# Patient Record
Sex: MALE | Race: ASIAN | NOT HISPANIC OR LATINO
[De-identification: names, ages, dates, MRNs, and addresses within clinical notes are randomized per-mention and may not be internally consistent; named-entity substitution may affect disease eponyms.]

---

## 2018-11-18 ENCOUNTER — APPOINTMENT (OUTPATIENT)
Dept: HUMAN REPRODUCTION | Facility: CLINIC | Age: 43
End: 2018-11-18

## 2019-01-06 ENCOUNTER — APPOINTMENT (OUTPATIENT)
Dept: HUMAN REPRODUCTION | Facility: CLINIC | Age: 44
End: 2019-01-06
Payer: COMMERCIAL

## 2019-01-06 PROCEDURE — 89322 SEMEN ANAL STRICT CRITERIA: CPT

## 2019-01-20 ENCOUNTER — TRANSCRIPTION ENCOUNTER (OUTPATIENT)
Age: 44
End: 2019-01-20

## 2019-01-20 ENCOUNTER — APPOINTMENT (OUTPATIENT)
Dept: HUMAN REPRODUCTION | Facility: CLINIC | Age: 44
End: 2019-01-20

## 2020-06-02 ENCOUNTER — TRANSCRIPTION ENCOUNTER (OUTPATIENT)
Age: 45
End: 2020-06-02

## 2020-08-13 ENCOUNTER — APPOINTMENT (OUTPATIENT)
Dept: INTERNAL MEDICINE | Facility: CLINIC | Age: 45
End: 2020-08-13
Payer: COMMERCIAL

## 2020-08-13 VITALS
WEIGHT: 132 LBS | HEART RATE: 77 BPM | BODY MASS INDEX: 22.26 KG/M2 | SYSTOLIC BLOOD PRESSURE: 121 MMHG | OXYGEN SATURATION: 99 % | HEIGHT: 64.5 IN | RESPIRATION RATE: 16 BRPM | TEMPERATURE: 97.9 F | DIASTOLIC BLOOD PRESSURE: 78 MMHG

## 2020-08-13 DIAGNOSIS — R56.9 UNSPECIFIED CONVULSIONS: ICD-10-CM

## 2020-08-13 DIAGNOSIS — Z82.49 FAMILY HISTORY OF ISCHEMIC HEART DISEASE AND OTHER DISEASES OF THE CIRCULATORY SYSTEM: ICD-10-CM

## 2020-08-13 DIAGNOSIS — E78.9 DISORDER OF LIPOPROTEIN METABOLISM, UNSPECIFIED: ICD-10-CM

## 2020-08-13 DIAGNOSIS — E78.00 PURE HYPERCHOLESTEROLEMIA, UNSPECIFIED: ICD-10-CM

## 2020-08-13 PROCEDURE — 99386 PREV VISIT NEW AGE 40-64: CPT

## 2020-08-13 NOTE — HEALTH RISK ASSESSMENT
[Good] : ~his/her~  mood as  good [No] : In the past 12 months have you used drugs other than those required for medical reasons? No [No falls in past year] : Patient reported no falls in the past year [0] : 2) Feeling down, depressed, or hopeless: Not at all (0) [None] : None [HIV test declined] : HIV test declined [Fully functional (using the telephone, shopping, preparing meals, housekeeping, doing laundry, using] : Fully functional and needs no help or supervision to perform IADLs (using the telephone, shopping, preparing meals, housekeeping, doing laundry, using transportation, managing medications and managing finances) [Feels Safe at Home] : Feels safe at home [Fully functional (bathing, dressing, toileting, transferring, walking, feeding)] : Fully functional (bathing, dressing, toileting, transferring, walking, feeding) [Carbon Monoxide Detector] : carbon monoxide detector [Seat Belt] :  uses seat belt [Smoke Detector] : smoke detector [Sunscreen] : uses sunscreen [With Patient/Caregiver] : With Patient/Caregiver [] : No [FreeTextEntry1] : Check up\par  [[id number]] : 0 [de-identified] : None [Change in mental status noted] : No change in mental status noted [Reports changes in hearing] : Reports no changes in hearing [Reports changes in vision] : Reports no changes in vision [Reports changes in dental health] : Reports no changes in dental health [AdvancecareDate] : 08/20

## 2020-08-13 NOTE — HISTORY OF PRESENT ILLNESS
[de-identified] : 44 year old male patient with history of stable , history as stated, presented for an annual preventative examination.\par Patient denies any associated symptoms of shortness of breath, chest pain, abdominal pain at this time.\par No reported break through seizures since more than 15 years.

## 2020-08-13 NOTE — ASSESSMENT
[FreeTextEntry1] : 44 year old male found to have stable Seizures,with the current regimen, diet and life style modifications, as counseled. Prior results reviewed and discussed with the patient during today's examination. Plan as ordered.\par Flu/TDAp vaccinations as feasible in the future, as per prior history of immunization.\par

## 2020-08-16 ENCOUNTER — TRANSCRIPTION ENCOUNTER (OUTPATIENT)
Age: 45
End: 2020-08-16

## 2020-08-16 DIAGNOSIS — U07.1 COVID-19: ICD-10-CM

## 2020-08-16 LAB
25(OH)D3 SERPL-MCNC: 52.6 NG/ML
ALBUMIN SERPL ELPH-MCNC: 4.8 G/DL
ALP BLD-CCNC: 69 U/L
ALT SERPL-CCNC: 18 U/L
ANION GAP SERPL CALC-SCNC: 12 MMOL/L
APPEARANCE: CLEAR
AST SERPL-CCNC: 19 U/L
BASOPHILS # BLD AUTO: 0.11 K/UL
BASOPHILS NFR BLD AUTO: 1.7 %
BILIRUB SERPL-MCNC: 0.4 MG/DL
BILIRUBIN URINE: NEGATIVE
BLOOD URINE: NEGATIVE
BUN SERPL-MCNC: 9 MG/DL
CALCIUM SERPL-MCNC: 9.5 MG/DL
CARBAMAZEPINE SERPL-MCNC: 5.7 UG/ML
CHLORIDE SERPL-SCNC: 97 MMOL/L
CHOLEST SERPL-MCNC: 184 MG/DL
CHOLEST/HDLC SERPL: 4.4 RATIO
CO2 SERPL-SCNC: 26 MMOL/L
COLOR: NORMAL
CREAT SERPL-MCNC: 1.03 MG/DL
CREAT SPEC-SCNC: 130 MG/DL
EOSINOPHIL # BLD AUTO: 0.54 K/UL
EOSINOPHIL NFR BLD AUTO: 8.4 %
ERYTHROCYTE [SEDIMENTATION RATE] IN BLOOD BY WESTERGREN METHOD: 2 MM/HR
ESTIMATED AVERAGE GLUCOSE: 103 MG/DL
FOLATE SERPL-MCNC: 18 NG/ML
GGT SERPL-CCNC: 36 U/L
GLUCOSE QUALITATIVE U: NEGATIVE
GLUCOSE SERPL-MCNC: 89 MG/DL
HBA1C MFR BLD HPLC: 5.2 %
HCT VFR BLD CALC: 46.9 %
HDLC SERPL-MCNC: 42 MG/DL
HGB BLD-MCNC: 15.1 G/DL
IMM GRANULOCYTES NFR BLD AUTO: 0.2 %
IRON SATN MFR SERPL: 50 %
IRON SERPL-MCNC: 154 UG/DL
KETONES URINE: NEGATIVE
LDLC SERPL CALC-MCNC: 123 MG/DL
LEUKOCYTE ESTERASE URINE: NEGATIVE
LYMPHOCYTES # BLD AUTO: 2.58 K/UL
LYMPHOCYTES NFR BLD AUTO: 40.2 %
MAN DIFF?: NORMAL
MCHC RBC-ENTMCNC: 27.4 PG
MCHC RBC-ENTMCNC: 32.2 GM/DL
MCV RBC AUTO: 85 FL
MICROALBUMIN 24H UR DL<=1MG/L-MCNC: <1.2 MG/DL
MICROALBUMIN/CREAT 24H UR-RTO: NORMAL MG/G
MONOCYTES # BLD AUTO: 0.51 K/UL
MONOCYTES NFR BLD AUTO: 8 %
NEUTROPHILS # BLD AUTO: 2.66 K/UL
NEUTROPHILS NFR BLD AUTO: 41.5 %
NITRITE URINE: NEGATIVE
PH URINE: 7.5
PLATELET # BLD AUTO: 212 K/UL
POTASSIUM SERPL-SCNC: 4.7 MMOL/L
PROT SERPL-MCNC: 7.2 G/DL
PROTEIN URINE: NEGATIVE
RBC # BLD: 5.52 M/UL
RBC # FLD: 12.4 %
SARS-COV-2 IGG SERPL IA-ACNC: 126 INDEX
SARS-COV-2 IGG SERPL QL IA: POSITIVE
SODIUM SERPL-SCNC: 135 MMOL/L
SPECIFIC GRAVITY URINE: 1.01
T3 SERPL-MCNC: 113 NG/DL
T4 FREE SERPL-MCNC: 1.1 NG/DL
TIBC SERPL-MCNC: 307 UG/DL
TRIGL SERPL-MCNC: 95 MG/DL
TSH SERPL-ACNC: 1.82 UIU/ML
UIBC SERPL-MCNC: 153 UG/DL
UROBILINOGEN URINE: NORMAL
VIT B12 SERPL-MCNC: 736 PG/ML
WBC # FLD AUTO: 6.41 K/UL

## 2021-06-23 ENCOUNTER — RX RENEWAL (OUTPATIENT)
Age: 46
End: 2021-06-23

## 2021-09-13 LAB
25(OH)D3 SERPL-MCNC: 52.5 NG/ML
ALBUMIN SERPL ELPH-MCNC: 4.7 G/DL
ALP BLD-CCNC: 62 U/L
ALT SERPL-CCNC: 31 U/L
ANION GAP SERPL CALC-SCNC: 10 MMOL/L
APPEARANCE: CLEAR
AST SERPL-CCNC: 24 U/L
BASOPHILS # BLD AUTO: 0.08 K/UL
BASOPHILS NFR BLD AUTO: 1.3 %
BILIRUB SERPL-MCNC: 0.5 MG/DL
BILIRUBIN URINE: NEGATIVE
BLOOD URINE: NEGATIVE
BUN SERPL-MCNC: 10 MG/DL
CALCIUM SERPL-MCNC: 9.5 MG/DL
CARBAMAZEPINE SERPL-MCNC: 6.8 UG/ML
CHLORIDE SERPL-SCNC: 97 MMOL/L
CHOLEST SERPL-MCNC: 210 MG/DL
CO2 SERPL-SCNC: 26 MMOL/L
COLOR: NORMAL
CREAT SERPL-MCNC: 1.06 MG/DL
CREAT SPEC-SCNC: 88 MG/DL
EOSINOPHIL # BLD AUTO: 0.61 K/UL
EOSINOPHIL NFR BLD AUTO: 9.6 %
ERYTHROCYTE [SEDIMENTATION RATE] IN BLOOD BY WESTERGREN METHOD: < 2 MM/HR
ESTIMATED AVERAGE GLUCOSE: 108 MG/DL
FOLATE SERPL-MCNC: >20 NG/ML
GGT SERPL-CCNC: 40 U/L
GLUCOSE QUALITATIVE U: NEGATIVE
GLUCOSE SERPL-MCNC: 98 MG/DL
HBA1C MFR BLD HPLC: 5.4 %
HCT VFR BLD CALC: 46 %
HDLC SERPL-MCNC: 41 MG/DL
HGB BLD-MCNC: 14.8 G/DL
IMM GRANULOCYTES NFR BLD AUTO: 0.2 %
IRON SATN MFR SERPL: 57 %
IRON SERPL-MCNC: 187 UG/DL
KETONES URINE: NEGATIVE
LDLC SERPL CALC-MCNC: 145 MG/DL
LEUKOCYTE ESTERASE URINE: NEGATIVE
LYMPHOCYTES # BLD AUTO: 2.74 K/UL
LYMPHOCYTES NFR BLD AUTO: 43 %
MAN DIFF?: NORMAL
MCHC RBC-ENTMCNC: 27.6 PG
MCHC RBC-ENTMCNC: 32.2 GM/DL
MCV RBC AUTO: 85.7 FL
MICROALBUMIN 24H UR DL<=1MG/L-MCNC: <1.2 MG/DL
MICROALBUMIN/CREAT 24H UR-RTO: NORMAL MG/G
MONOCYTES # BLD AUTO: 0.51 K/UL
MONOCYTES NFR BLD AUTO: 8 %
NEUTROPHILS # BLD AUTO: 2.42 K/UL
NEUTROPHILS NFR BLD AUTO: 37.9 %
NITRITE URINE: NEGATIVE
NONHDLC SERPL-MCNC: 169 MG/DL
PH URINE: 7.5
PLATELET # BLD AUTO: 196 K/UL
POTASSIUM SERPL-SCNC: 4.6 MMOL/L
PROT SERPL-MCNC: 7.2 G/DL
PROTEIN URINE: NEGATIVE
RBC # BLD: 5.37 M/UL
RBC # FLD: 13 %
SODIUM SERPL-SCNC: 132 MMOL/L
SPECIFIC GRAVITY URINE: 1.01
T3 SERPL-MCNC: 99 NG/DL
T4 FREE SERPL-MCNC: 1.1 NG/DL
TIBC SERPL-MCNC: 325 UG/DL
TRIGL SERPL-MCNC: 118 MG/DL
TSH SERPL-ACNC: 1.89 UIU/ML
UIBC SERPL-MCNC: 138 UG/DL
UROBILINOGEN URINE: NORMAL
VIT B12 SERPL-MCNC: 869 PG/ML
WBC # FLD AUTO: 6.37 K/UL

## 2021-09-18 ENCOUNTER — APPOINTMENT (OUTPATIENT)
Dept: INTERNAL MEDICINE | Facility: CLINIC | Age: 46
End: 2021-09-18
Payer: COMMERCIAL

## 2021-09-18 VITALS
BODY MASS INDEX: 22.77 KG/M2 | DIASTOLIC BLOOD PRESSURE: 72 MMHG | OXYGEN SATURATION: 98 % | RESPIRATION RATE: 16 BRPM | WEIGHT: 135 LBS | TEMPERATURE: 97.7 F | HEART RATE: 76 BPM | SYSTOLIC BLOOD PRESSURE: 107 MMHG | HEIGHT: 64.5 IN

## 2021-09-18 PROCEDURE — 99396 PREV VISIT EST AGE 40-64: CPT

## 2021-09-18 NOTE — HEALTH RISK ASSESSMENT
[Good] : ~his/her~  mood as  good [No] : In the past 12 months have you used drugs other than those required for medical reasons? No [No falls in past year] : Patient reported no falls in the past year [0] : 2) Feeling down, depressed, or hopeless: Not at all (0) [HIV test declined] : HIV test declined [None] : None [Feels Safe at Home] : Feels safe at home [Fully functional (bathing, dressing, toileting, transferring, walking, feeding)] : Fully functional (bathing, dressing, toileting, transferring, walking, feeding) [Fully functional (using the telephone, shopping, preparing meals, housekeeping, doing laundry, using] : Fully functional and needs no help or supervision to perform IADLs (using the telephone, shopping, preparing meals, housekeeping, doing laundry, using transportation, managing medications and managing finances) [Smoke Detector] : smoke detector [Carbon Monoxide Detector] : carbon monoxide detector [Seat Belt] :  uses seat belt [Sunscreen] : uses sunscreen [With Patient/Caregiver] : , with patient/caregiver [FreeTextEntry1] : Check up\par  [] : No [de-identified] : None [[id number]] : 0 [Change in mental status noted] : No change in mental status noted [Reports changes in hearing] : Reports no changes in hearing [Reports changes in vision] : Reports no changes in vision [Reports changes in dental health] : Reports no changes in dental health [AdvancecareDate] : 09/21

## 2021-09-18 NOTE — ASSESSMENT
[FreeTextEntry1] : 45 year old male found to have stable Hypercholesterolemia, Seizures,with the current prescription regimen as recommended, diet and life style modifications, as counseled. Prior results reviewed, interpreted and discussed with the patient during today's examination, as appropriate. Follow up, treatment plan and tests, as ordered.

## 2021-09-18 NOTE — HISTORY OF PRESENT ILLNESS
[de-identified] : 45 year old male patient with history of stable Hypercholesterolemia, Seizures, history as stated, presented for an annual preventative examination.\par Patient denies any associated symptoms of shortness of breath, chest pain, abdominal pain at this time.\par No reported break through seizures.\par

## 2022-04-22 ENCOUNTER — RX RENEWAL (OUTPATIENT)
Age: 47
End: 2022-04-22

## 2022-12-03 ENCOUNTER — APPOINTMENT (OUTPATIENT)
Dept: INTERNAL MEDICINE | Facility: CLINIC | Age: 47
End: 2022-12-03

## 2022-12-18 LAB
ALBUMIN SERPL ELPH-MCNC: 4.7 G/DL
ALP BLD-CCNC: 77 U/L
ALT SERPL-CCNC: 18 U/L
ANION GAP SERPL CALC-SCNC: 17 MMOL/L
APPEARANCE: CLEAR
AST SERPL-CCNC: 19 U/L
BACTERIA: NEGATIVE
BASOPHILS # BLD AUTO: 0.08 K/UL
BASOPHILS NFR BLD AUTO: 1.4 %
BILIRUB SERPL-MCNC: 0.4 MG/DL
BILIRUBIN URINE: NEGATIVE
BLOOD URINE: NEGATIVE
BUN SERPL-MCNC: 10 MG/DL
CALCIUM SERPL-MCNC: 9.5 MG/DL
CARBAMAZEPINE SERPL-MCNC: 7.3 UG/ML
CHLORIDE SERPL-SCNC: 97 MMOL/L
CHOLEST SERPL-MCNC: 209 MG/DL
CO2 SERPL-SCNC: 20 MMOL/L
COLOR: NORMAL
CREAT SERPL-MCNC: 1.08 MG/DL
CREAT SPEC-SCNC: 110 MG/DL
EGFR: 86 ML/MIN/1.73M2
EOSINOPHIL # BLD AUTO: 0.44 K/UL
EOSINOPHIL NFR BLD AUTO: 7.6 %
ESTIMATED AVERAGE GLUCOSE: 105 MG/DL
GGT SERPL-CCNC: 34 U/L
GLUCOSE QUALITATIVE U: NEGATIVE
GLUCOSE SERPL-MCNC: 93 MG/DL
HBA1C MFR BLD HPLC: 5.3 %
HCT VFR BLD CALC: 47 %
HDLC SERPL-MCNC: 45 MG/DL
HGB BLD-MCNC: 15.3 G/DL
HYALINE CASTS: 0 /LPF
IMM GRANULOCYTES NFR BLD AUTO: 0.2 %
KETONES URINE: NEGATIVE
LDLC SERPL CALC-MCNC: 142 MG/DL
LEUKOCYTE ESTERASE URINE: NEGATIVE
LYMPHOCYTES # BLD AUTO: 2.36 K/UL
LYMPHOCYTES NFR BLD AUTO: 41 %
MAN DIFF?: NORMAL
MCHC RBC-ENTMCNC: 27.4 PG
MCHC RBC-ENTMCNC: 32.6 GM/DL
MCV RBC AUTO: 84.2 FL
MICROALBUMIN 24H UR DL<=1MG/L-MCNC: <1.2 MG/DL
MICROALBUMIN/CREAT 24H UR-RTO: NORMAL MG/G
MICROSCOPIC-UA: NORMAL
MONOCYTES # BLD AUTO: 0.53 K/UL
MONOCYTES NFR BLD AUTO: 9.2 %
NEUTROPHILS # BLD AUTO: 2.34 K/UL
NEUTROPHILS NFR BLD AUTO: 40.6 %
NITRITE URINE: NEGATIVE
NONHDLC SERPL-MCNC: 164 MG/DL
PH URINE: 7
PLATELET # BLD AUTO: 236 K/UL
POTASSIUM SERPL-SCNC: 4.9 MMOL/L
PROT SERPL-MCNC: 7.2 G/DL
PROTEIN URINE: NEGATIVE
RBC # BLD: 5.58 M/UL
RBC # FLD: 13 %
RED BLOOD CELLS URINE: 3 /HPF
SODIUM SERPL-SCNC: 135 MMOL/L
SPECIFIC GRAVITY URINE: 1.01
SQUAMOUS EPITHELIAL CELLS: 0 /HPF
TRIGL SERPL-MCNC: 106 MG/DL
TSH SERPL-ACNC: 2.08 UIU/ML
UROBILINOGEN URINE: NORMAL
WBC # FLD AUTO: 5.76 K/UL
WHITE BLOOD CELLS URINE: 0 /HPF

## 2022-12-22 ENCOUNTER — APPOINTMENT (OUTPATIENT)
Dept: INTERNAL MEDICINE | Facility: CLINIC | Age: 47
End: 2022-12-22

## 2022-12-22 VITALS
WEIGHT: 139 LBS | DIASTOLIC BLOOD PRESSURE: 79 MMHG | RESPIRATION RATE: 16 BRPM | SYSTOLIC BLOOD PRESSURE: 116 MMHG | BODY MASS INDEX: 23.73 KG/M2 | TEMPERATURE: 98 F | HEART RATE: 64 BPM | OXYGEN SATURATION: 99 % | HEIGHT: 64 IN

## 2022-12-22 PROCEDURE — 99396 PREV VISIT EST AGE 40-64: CPT

## 2022-12-22 NOTE — HEALTH RISK ASSESSMENT
[Good] : ~his/her~  mood as  good [Never] : Never [No] : In the past 12 months have you used drugs other than those required for medical reasons? No [No falls in past year] : Patient reported no falls in the past year [0] : 2) Feeling down, depressed, or hopeless: Not at all (0) [HIV test declined] : HIV test declined [None] : None [Feels Safe at Home] : Feels safe at home [Fully functional (bathing, dressing, toileting, transferring, walking, feeding)] : Fully functional (bathing, dressing, toileting, transferring, walking, feeding) [Fully functional (using the telephone, shopping, preparing meals, housekeeping, doing laundry, using] : Fully functional and needs no help or supervision to perform IADLs (using the telephone, shopping, preparing meals, housekeeping, doing laundry, using transportation, managing medications and managing finances) [Smoke Detector] : smoke detector [Carbon Monoxide Detector] : carbon monoxide detector [Seat Belt] :  uses seat belt [Sunscreen] : uses sunscreen [With Patient/Caregiver] : , with patient/caregiver [FreeTextEntry1] : Check up\par  [de-identified] : None [[id number]] : 0 [Change in mental status noted] : No change in mental status noted [Reports changes in hearing] : Reports no changes in hearing [Reports changes in vision] : Reports no changes in vision [Reports changes in dental health] : Reports no changes in dental health [AdvancecareDate] : 12/22

## 2022-12-22 NOTE — ASSESSMENT
[FreeTextEntry1] : 47 year old male found to have stable Hypercholesterolemia, Seizures,with the current prescription regimen as recommended, diet and life style modifications, as counseled. Prior results reviewed, interpreted and discussed with the patient during today's examination, as appropriate. Follow up, treatment plan and tests, as ordered.\par \par

## 2022-12-22 NOTE — HISTORY OF PRESENT ILLNESS
[de-identified] : 47 year old male patient with history of stable Hypercholesterolemia, Seizures, history as stated, presented for an annual preventative examination.\par Patient denies any associated symptoms of shortness of breath, chest pain, abdominal pain at this time.\par No reported break through seizures.\par

## 2022-12-22 NOTE — REVIEW OF SYSTEMS
[Negative] : Heme/Lymph [FreeTextEntry9] : Left knee resolving painful swelling, if symptoms persist, consider orthopedic follow-up, as counseled.

## 2023-12-18 ENCOUNTER — LABORATORY RESULT (OUTPATIENT)
Age: 48
End: 2023-12-18

## 2023-12-21 ENCOUNTER — APPOINTMENT (OUTPATIENT)
Dept: INTERNAL MEDICINE | Facility: CLINIC | Age: 48
End: 2023-12-21
Payer: COMMERCIAL

## 2023-12-21 VITALS
TEMPERATURE: 98.6 F | OXYGEN SATURATION: 100 % | HEIGHT: 64 IN | DIASTOLIC BLOOD PRESSURE: 83 MMHG | HEART RATE: 64 BPM | RESPIRATION RATE: 16 BRPM | WEIGHT: 140 LBS | SYSTOLIC BLOOD PRESSURE: 132 MMHG | BODY MASS INDEX: 23.9 KG/M2

## 2023-12-21 DIAGNOSIS — Z00.00 ENCOUNTER FOR GENERAL ADULT MEDICAL EXAMINATION W/OUT ABNORMAL FINDINGS: ICD-10-CM

## 2023-12-21 PROCEDURE — 99396 PREV VISIT EST AGE 40-64: CPT

## 2023-12-21 RX ORDER — CARBAMAZEPINE 200 MG/1
200 TABLET ORAL TWICE DAILY
Qty: 360 | Refills: 3 | Status: ACTIVE | COMMUNITY
Start: 2021-06-23 | End: 1900-01-01

## 2023-12-21 NOTE — HEALTH RISK ASSESSMENT
[Good] : ~his/her~  mood as  good [No] : In the past 12 months have you used drugs other than those required for medical reasons? No [No falls in past year] : Patient reported no falls in the past year [0] : 2) Feeling down, depressed, or hopeless: Not at all (0) [HIV test declined] : HIV test declined [None] : None [Feels Safe at Home] : Feels safe at home [Fully functional (bathing, dressing, toileting, transferring, walking, feeding)] : Fully functional (bathing, dressing, toileting, transferring, walking, feeding) [Fully functional (using the telephone, shopping, preparing meals, housekeeping, doing laundry, using] : Fully functional and needs no help or supervision to perform IADLs (using the telephone, shopping, preparing meals, housekeeping, doing laundry, using transportation, managing medications and managing finances) [Smoke Detector] : smoke detector [Carbon Monoxide Detector] : carbon monoxide detector [Seat Belt] :  uses seat belt [Sunscreen] : uses sunscreen [With Patient/Caregiver] : , with patient/caregiver [Never] : Never [FreeTextEntry1] : Check up\par   [de-identified] : None [[id number]] : 0 [Change in mental status noted] : No change in mental status noted [Reports changes in hearing] : Reports no changes in hearing [Reports changes in vision] : Reports no changes in vision [Reports changes in dental health] : Reports no changes in dental health [AdvancecareDate] : 12/23

## 2023-12-21 NOTE — ASSESSMENT
[FreeTextEntry1] : 48 year old male found to have stable Hypercholesterolemia, Seizures, with the current prescription regimen as recommended, diet and lifestyle modifications, as counseled. Prior results reviewed, interpreted and discussed with the patient during today's examination, as appropriate. Follow up, treatment plan and tests, as ordered.

## 2023-12-21 NOTE — PHYSICAL EXAM
[TextEntry] : CONSTITUTIONAL:  No acute distress, well developed and well appearing. EYES:  Normal sclera/conjunctiva, PERRLA, EOMI. ENT:  Normal outer ears/nose, normal oropharynx. NECK:  No JVD, supple, thyroid normal/no nodules, no lymphadenopathy. PULMONARY:  No respiratory distress, clear to auscultation, no accessory muscle use. CARDIAC:  Normal rate, regular rhythm, normal S1/S2, no murmur. VASCULAR:  No carotid bruits, no abdominal bruit, no varicosities, pedal pulses present, no edema, no extremity clubbing/cyanosis. ABDOMEN:  Normoactive bowel sounds, soft and nontender, no hepatosplenomegaly or masses appreciated. BACK:  No CVA tenderness, no spinal tenderness. MUSCULOSKELETAL:  No joint swelling, grossly normal strength/tone. SKIN:  No rash, normal turgor. NEUROLOGY:  Normal gait and coordination, no focal deficits.

## 2023-12-21 NOTE — HISTORY OF PRESENT ILLNESS
[de-identified] : 48 year old male patient with history of stable Hypercholesterolemia, Seizures, history as stated, presented for an annual preventative examination.  No reported break through seizures.

## 2025-02-21 PROBLEM — Z12.11 COLON CANCER SCREENING: Status: ACTIVE | Noted: 2025-02-21

## 2025-02-22 ENCOUNTER — APPOINTMENT (OUTPATIENT)
Dept: INTERNAL MEDICINE | Facility: CLINIC | Age: 50
End: 2025-02-22
Payer: COMMERCIAL

## 2025-02-22 VITALS
SYSTOLIC BLOOD PRESSURE: 115 MMHG | OXYGEN SATURATION: 100 % | WEIGHT: 131 LBS | HEIGHT: 64 IN | RESPIRATION RATE: 16 BRPM | DIASTOLIC BLOOD PRESSURE: 77 MMHG | BODY MASS INDEX: 22.36 KG/M2 | TEMPERATURE: 97.3 F | HEART RATE: 78 BPM

## 2025-02-22 DIAGNOSIS — Z00.00 ENCOUNTER FOR GENERAL ADULT MEDICAL EXAMINATION W/OUT ABNORMAL FINDINGS: ICD-10-CM

## 2025-02-22 DIAGNOSIS — Z12.11 ENCOUNTER FOR SCREENING FOR MALIGNANT NEOPLASM OF COLON: ICD-10-CM

## 2025-02-22 PROCEDURE — 99396 PREV VISIT EST AGE 40-64: CPT
